# Patient Record
Sex: FEMALE | Race: WHITE | HISPANIC OR LATINO | URBAN - NONMETROPOLITAN AREA
[De-identification: names, ages, dates, MRNs, and addresses within clinical notes are randomized per-mention and may not be internally consistent; named-entity substitution may affect disease eponyms.]

---

## 2024-05-25 ENCOUNTER — HOSPITAL ENCOUNTER (OUTPATIENT)
Dept: LAB | Facility: MEDICAL CENTER | Age: 29
End: 2024-05-25
Attending: FAMILY MEDICINE
Payer: COMMERCIAL

## 2024-05-25 LAB
BASOPHILS # BLD AUTO: 0.4 % (ref 0–1.8)
BASOPHILS # BLD: 0.02 K/UL (ref 0–0.12)
CRP SERPL HS-MCNC: <0.3 MG/DL (ref 0–0.75)
EOSINOPHIL # BLD AUTO: 0.12 K/UL (ref 0–0.51)
EOSINOPHIL NFR BLD: 2.4 % (ref 0–6.9)
ERYTHROCYTE [DISTWIDTH] IN BLOOD BY AUTOMATED COUNT: 41 FL (ref 35.9–50)
ERYTHROCYTE [SEDIMENTATION RATE] IN BLOOD BY WESTERGREN METHOD: 6 MM/HOUR (ref 0–25)
HCT VFR BLD AUTO: 42.6 % (ref 37–47)
HGB BLD-MCNC: 14.4 G/DL (ref 12–16)
IMM GRANULOCYTES # BLD AUTO: 0.01 K/UL (ref 0–0.11)
IMM GRANULOCYTES NFR BLD AUTO: 0.2 % (ref 0–0.9)
LYMPHOCYTES # BLD AUTO: 2.16 K/UL (ref 1–4.8)
LYMPHOCYTES NFR BLD: 42.5 % (ref 22–41)
MCH RBC QN AUTO: 31 PG (ref 27–33)
MCHC RBC AUTO-ENTMCNC: 33.8 G/DL (ref 32.2–35.5)
MCV RBC AUTO: 91.8 FL (ref 81.4–97.8)
MONOCYTES # BLD AUTO: 0.36 K/UL (ref 0–0.85)
MONOCYTES NFR BLD AUTO: 7.1 % (ref 0–13.4)
NEUTROPHILS # BLD AUTO: 2.41 K/UL (ref 1.82–7.42)
NEUTROPHILS NFR BLD: 47.4 % (ref 44–72)
NRBC # BLD AUTO: 0 K/UL
NRBC BLD-RTO: 0 /100 WBC (ref 0–0.2)
PLATELET # BLD AUTO: 307 K/UL (ref 164–446)
PMV BLD AUTO: 10.8 FL (ref 9–12.9)
RBC # BLD AUTO: 4.64 M/UL (ref 4.2–5.4)
RHEUMATOID FACT SER IA-ACNC: 12 IU/ML (ref 0–14)
URATE SERPL-MCNC: 3.1 MG/DL (ref 1.9–8.2)
WBC # BLD AUTO: 5.1 K/UL (ref 4.8–10.8)

## 2024-05-27 LAB
CCP IGA+IGG SERPL IA-ACNC: 2 UNITS (ref 0–19)
NUCLEAR IGG SER QL IA: NORMAL

## 2024-09-25 ENCOUNTER — APPOINTMENT (OUTPATIENT)
Dept: RADIOLOGY | Facility: IMAGING CENTER | Age: 29
End: 2024-09-25
Attending: FAMILY MEDICINE
Payer: COMMERCIAL

## 2024-09-25 ENCOUNTER — OFFICE VISIT (OUTPATIENT)
Dept: URGENT CARE | Facility: PHYSICIAN GROUP | Age: 29
End: 2024-09-25
Payer: COMMERCIAL

## 2024-09-25 VITALS
WEIGHT: 123 LBS | TEMPERATURE: 97.3 F | DIASTOLIC BLOOD PRESSURE: 68 MMHG | HEART RATE: 74 BPM | RESPIRATION RATE: 16 BRPM | HEIGHT: 63 IN | SYSTOLIC BLOOD PRESSURE: 124 MMHG | BODY MASS INDEX: 21.79 KG/M2 | OXYGEN SATURATION: 100 %

## 2024-09-25 DIAGNOSIS — M25.50 POLYARTHRALGIA: ICD-10-CM

## 2024-09-25 PROCEDURE — 99214 OFFICE O/P EST MOD 30 MIN: CPT | Performed by: FAMILY MEDICINE

## 2024-09-25 PROCEDURE — 77077 JOINT SURVEY SINGLE VIEW: CPT | Mod: TC,FY | Performed by: RADIOLOGY

## 2024-09-25 PROCEDURE — 3074F SYST BP LT 130 MM HG: CPT | Performed by: FAMILY MEDICINE

## 2024-09-25 PROCEDURE — 3078F DIAST BP <80 MM HG: CPT | Performed by: FAMILY MEDICINE

## 2024-09-25 RX ORDER — PREDNISONE 20 MG/1
60 TABLET ORAL DAILY
Qty: 15 TABLET | Refills: 0 | Status: SHIPPED | OUTPATIENT
Start: 2024-09-25 | End: 2024-09-30

## 2024-09-25 RX ORDER — NORGESTIMATE AND ETHINYL ESTRADIOL 0.25-0.035
1 KIT ORAL
COMMUNITY
Start: 2024-09-23

## 2024-09-25 NOTE — PROGRESS NOTES
"Cc:   chronic pain in joints        HPI:        C/o pain in both hands , wrists, elbows every day x 3 mths.      She saw PCP in June - she had autoimmune w/u, but does not know results       Pain was completley alleviated with one week of prednisone.           Review of Systems   Constitutional: Negative for fever, chills and malaise/fatigue.   Eyes: Negative for vision changes, d/c.    Respiratory: Negative for cough and sputum production.    Cardiovascular: Negative for chest pain and palpitations.   Gastrointestinal: Negative for nausea, vomiting, abdominal pain, diarrhea and constipation.   Genitourinary: Negative for dysuria, urgency and frequency.   Skin: Negative for rash or  itching.   Neurological: Negative for dizziness and tingling.   Psychiatric/Behavioral: Negative for depression.   Hematologic/lymphatic - denies bruising or excessive bleeding  All other systems reviewed and are negative.      OBJECTIVE  /68   Pulse 74   Temp 36.3 °C (97.3 °F) (Temporal)   Resp 16   Ht 1.6 m (5' 3\")   Wt 55.8 kg (123 lb)   SpO2 100%   HEENT - PERRLA, EOMI  Neuro - alert and oriented x3. CN 2-12 grossly intact.  Lungs - CTA. No wheezes, rhonchi or rales.  Heart - regular rate and rhythm without murmur.  Abdomen - soft and non-tender, bowel sounds active x4.  Musculoskeletal:        Bilat hands/wrists:  unremarkable.    No erythema, skin changes or joint swelling, effusions or deformities.     No visits with results within 4 Month(s) from this visit.   Latest known visit with results is:   Hospital Outpatient Visit on 05/25/2024   Component Date Value Ref Range Status    Antinuclear Antibody 05/25/2024 None Detected  None Detected Final    Comment: If suspicion of connective tissue disease is strong and MELVIN EIA is  negative, consider testing for MELVIN by IFA ([id number]).    No antibodies to Anti-Nuclear Antibodies (MELVIN) detected.  The  Extractable Nuclear Antigen Antibodies (RNP, Arrington, SSA 52, SSA  60, " Scleroderma, Mary-1 and SSB) and Double Stranded DNA (dsDNA)  Antibody, IgG will not be performed.  INTERPRETIVE INFORMATION: Anti-Nuclear Antibodies (MELVIN), IgG by  SINAN  Antinuclear Antibodies (MELVIN), IgG by SINAN: MELVIN specimens are  screened using enzyme-linked immunosorbent assay (SINAN)  methodology. All SINAN results reported as Detected are further  tested by indirect fluorescent assay (IFA) using HEp-2 substrate  with an IgG-specific conjugate. The MELVIN SINAN screen is designed  to detect antibodies against dsDNA, histones, SS-A (Ro), SS-B  (La), Arrington, Arrington/RNP, Scl-70, Mary-1, centromeric proteins, other  antigens extracted from the HEp-2 cell nucleus. MELVIN SINAN assays  have been reported to have lower sensitivities than MELVIN IFA                            for  systemic autoimmune rheumatic diseases (SARD).  Negative results do not necessarily rule out SARD.  Performed By: [facility]  [address] [place] [id number]  : Kvng Chen MD, PhD  CLIA Number: [id number]      WBC 05/25/2024 5.1  4.8 - 10.8 K/uL Final    RBC 05/25/2024 4.64  4.20 - 5.40 M/uL Final    Hemoglobin 05/25/2024 14.4  12.0 - 16.0 g/dL Final    Hematocrit 05/25/2024 42.6  37.0 - 47.0 % Final    MCV 05/25/2024 91.8  81.4 - 97.8 fL Final    MCH 05/25/2024 31.0  27.0 - 33.0 pg Final    MCHC 05/25/2024 33.8  32.2 - 35.5 g/dL Final    RDW 05/25/2024 41.0  35.9 - 50.0 fL Final    Platelet Count 05/25/2024 307  164 - 446 K/uL Final    MPV 05/25/2024 10.8  9.0 - 12.9 fL Final    Neutrophils-Polys 05/25/2024 47.40  44.00 - 72.00 % Final    Lymphocytes 05/25/2024 42.50 (H)  22.00 - 41.00 % Final    Monocytes 05/25/2024 7.10  0.00 - 13.40 % Final    Eosinophils 05/25/2024 2.40  0.00 - 6.90 % Final    Basophils 05/25/2024 0.40  0.00 - 1.80 % Final    Immature Granulocytes 05/25/2024 0.20  0.00 - 0.90 % Final    Nucleated RBC 05/25/2024 0.00  0.00 - 0.20 /100 WBC Final    Neutrophils (Absolute) 05/25/2024 2.41   1.82 - 7.42 K/uL Final    Includes immature neutrophils, if present.    Lymphs (Absolute) 05/25/2024 2.16  1.00 - 4.80 K/uL Final    Monos (Absolute) 05/25/2024 0.36  0.00 - 0.85 K/uL Final    Eos (Absolute) 05/25/2024 0.12  0.00 - 0.51 K/uL Final    Baso (Absolute) 05/25/2024 0.02  0.00 - 0.12 K/uL Final    Immature Granulocytes (abs) 05/25/2024 0.01  0.00 - 0.11 K/uL Final    NRBC (Absolute) 05/25/2024 0.00  K/uL Final    Rheumatoid Factor -Neph- 05/25/2024 12  0 - 14 IU/mL Final    Cyclic Citrullinated Peptide Ab, I* 05/25/2024 2  0 - 19 Units Final    Comment: INTERPRETIVE INFORMATION: Cyclic Citrullinated Peptide Ab, IgG/A  19 Units or less ................... Negative  20-39 Units ........................ Weak Positive  40-59 Units ........................ Moderate Positive  60 Units or greater ................ Strong Positive  A positive result for cyclic citrullinated peptide (CCP)  antibodies in conjunction with consistent clinical features may be  suggestive of rheumatoid arthritis (RA). Anti-CCP, IgG/IgA  antibodies are present in about 66-74 percent of RA patients and  have specificities of 96-99 percent. Detection of IgA antibodies  in addition to the usual IgG antibodies enhances the sensitivity  due to some RA patients having IgA antibodies to CCP in the  absence of IgG. These autoantibodies may be present in the  preclinical phase of disease, are associated with future RA  development, and may predict radiographic joint destruction.  Patients with weak positive results should be monitored and  testing repeated.  Performed By: [facility]                           [facility]  [address] [place] [id number]  : Kvng Chen MD, PhD  CLIA Number: [id number]      Stat C-Reactive Protein 05/25/2024 <0.30  0.00 - 0.75 mg/dL Final    Sed Rate Westergren 05/25/2024 6  0 - 25 mm/hour Final    Uric Acid 05/25/2024 3.1  1.9 - 8.2 mg/dL Final      Result Notes  Details    Reading  Physician Reading Date Result Priority   Bay Camilo M.D.  [phone] 9/25/2024      Narrative & Impression     9/25/2024 1:27 PM     HISTORY/REASON FOR EXAM:  Pain in the hands.        TECHNIQUE/EXAM DESCRIPTION AND NUMBER OF VIEWS:  Joint survey, single view of the hands.     COMPARISON: None     FINDINGS:  There is no evidence of acute fracture. There is no evidence of dislocation.  No focal bone erosions are appreciated around the joint spaces.  No joint surface irregularity is identified.     No calcified or ossified soft tissue mass is identified.     IMPRESSION:     1.  No acute fracture is identified. No bone erosions are identified.              Exam Ended: 09/25/24  1:38 PM Last Resulted: 09/25/24  1:40 PM        A/P:      1. Polyarthralgia  Autoimmune labs reviewed:   Sed rate, RF, MELVIN all negative.     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       Will trial 60mg prednisone x 5 d      Advised f/u PCP        - predniSONE (DELTASONE) 20 MG Tab; Take 3 Tablets by mouth every day for 5 days.  Dispense: 15 Tablet; Refill: 0  - DX-JOINT SURVEY-HANDS SINGLE VIEW; Future

## 2025-03-29 ENCOUNTER — OFFICE VISIT (OUTPATIENT)
Dept: URGENT CARE | Facility: PHYSICIAN GROUP | Age: 30
End: 2025-03-29
Payer: COMMERCIAL

## 2025-03-29 VITALS
SYSTOLIC BLOOD PRESSURE: 122 MMHG | HEIGHT: 63 IN | TEMPERATURE: 96.3 F | DIASTOLIC BLOOD PRESSURE: 66 MMHG | HEART RATE: 72 BPM | WEIGHT: 127 LBS | RESPIRATION RATE: 16 BRPM | OXYGEN SATURATION: 98 % | BODY MASS INDEX: 22.5 KG/M2

## 2025-03-29 DIAGNOSIS — K02.9 PAIN DUE TO DENTAL CARIES: ICD-10-CM

## 2025-03-29 RX ORDER — LIDOCAINE HYDROCHLORIDE 20 MG/ML
15 SOLUTION OROPHARYNGEAL PRN
Qty: 1200 ML | Refills: 0 | Status: SHIPPED | OUTPATIENT
Start: 2025-03-29

## 2025-03-29 RX ORDER — AMOXICILLIN 500 MG/1
500 CAPSULE ORAL 2 TIMES DAILY
Qty: 14 CAPSULE | Refills: 0 | Status: SHIPPED | OUTPATIENT
Start: 2025-03-29 | End: 2025-04-05

## 2025-03-29 ASSESSMENT — ENCOUNTER SYMPTOMS: FEVER: 0

## 2025-03-29 NOTE — PROGRESS NOTES
"Subjective:   Lily BLISS is a 29 y.o. female who presents for Oral Swelling (Dentist appointment on Tuesday, constant pain/throbbing x3days)      Patient with existing cavity that needs a filling however over the course of a week she started noting some discomfort. Over the last 3 days has had worsening pain     Review of Systems   Constitutional:  Negative for fever.       Medications, Allergies, and current problem list reviewed today in Epic.     Objective:     /66 (BP Location: Right arm, Patient Position: Sitting, BP Cuff Size: Adult)   Pulse 72   Temp (!) 35.7 °C (96.3 °F) (Temporal)   Resp 16   Ht 1.6 m (5' 3\")   Wt 57.6 kg (127 lb)   SpO2 98%     Physical Exam  Vitals reviewed.   Constitutional:       Appearance: Normal appearance.   HENT:      Head: Normocephalic and atraumatic.      Right Ear: External ear normal.      Left Ear: External ear normal.      Nose: Nose normal.      Mouth/Throat:      Mouth: Mucous membranes are moist.        Comments: Area of tooth emergence with redness of gingiva. No pointing or abscess  Eyes:      Extraocular Movements: Extraocular movements intact.      Conjunctiva/sclera: Conjunctivae normal.      Pupils: Pupils are equal, round, and reactive to light.   Cardiovascular:      Rate and Rhythm: Normal rate.   Pulmonary:      Effort: Pulmonary effort is normal.   Skin:     General: Skin is warm and dry.      Capillary Refill: Capillary refill takes less than 2 seconds.   Neurological:      Mental Status: She is alert and oriented to person, place, and time.         Assessment/Plan:     Diagnosis and associated orders:     1. Pain due to dental caries  amoxicillin (AMOXIL) 500 MG Cap    lidocaine (XYLOCAINE) 2 % Solution         Comments/MDM:     Follow up with dentist         Differential diagnosis, natural history, supportive care, and indications for immediate follow-up discussed.    Advised the patient to follow-up with the primary care physician for " recheck, reevaluation, and consideration of further management.    Please note that this dictation was created using voice recognition software. I have made a reasonable attempt to correct obvious errors, but I expect that there are errors of grammar and possibly content that I did not discover before finalizing the note.    This note was electronically signed by Alcon Roberto PA-C

## 2025-08-27 ENCOUNTER — OFFICE VISIT (OUTPATIENT)
Dept: URGENT CARE | Facility: PHYSICIAN GROUP | Age: 30
End: 2025-08-27
Payer: COMMERCIAL

## 2025-08-27 VITALS
HEART RATE: 96 BPM | OXYGEN SATURATION: 98 % | TEMPERATURE: 98.3 F | DIASTOLIC BLOOD PRESSURE: 70 MMHG | WEIGHT: 124 LBS | HEIGHT: 63 IN | BODY MASS INDEX: 21.97 KG/M2 | SYSTOLIC BLOOD PRESSURE: 122 MMHG | RESPIRATION RATE: 18 BRPM

## 2025-08-27 DIAGNOSIS — U07.1 COVID-19: Primary | ICD-10-CM

## 2025-08-27 DIAGNOSIS — J02.9 PHARYNGITIS, UNSPECIFIED ETIOLOGY: ICD-10-CM

## 2025-08-27 LAB
FLUAV RNA SPEC QL NAA+PROBE: NEGATIVE
FLUBV RNA SPEC QL NAA+PROBE: NEGATIVE
RSV RNA SPEC QL NAA+PROBE: NEGATIVE
S PYO DNA SPEC NAA+PROBE: NOT DETECTED
SARS-COV-2 RNA RESP QL NAA+PROBE: POSITIVE

## 2025-08-27 PROCEDURE — 99213 OFFICE O/P EST LOW 20 MIN: CPT | Performed by: NURSE PRACTITIONER

## 2025-08-27 PROCEDURE — 3074F SYST BP LT 130 MM HG: CPT | Performed by: NURSE PRACTITIONER

## 2025-08-27 PROCEDURE — 3078F DIAST BP <80 MM HG: CPT | Performed by: NURSE PRACTITIONER

## 2025-08-27 PROCEDURE — 87651 STREP A DNA AMP PROBE: CPT | Performed by: NURSE PRACTITIONER

## 2025-08-27 PROCEDURE — 87637 SARSCOV2&INF A&B&RSV AMP PRB: CPT | Performed by: NURSE PRACTITIONER

## 2025-08-27 RX ORDER — AMOXICILLIN 500 MG/1
TABLET, FILM COATED ORAL
COMMUNITY
Start: 2025-07-31

## 2025-08-27 RX ORDER — HYDROCODONE BITARTRATE AND ACETAMINOPHEN 5; 325 MG/1; MG/1
1 TABLET ORAL EVERY 6 HOURS PRN
COMMUNITY
Start: 2025-07-31

## 2025-08-27 RX ORDER — ONDANSETRON 4 MG/1
TABLET, ORALLY DISINTEGRATING ORAL
COMMUNITY
Start: 2025-07-31

## 2025-08-27 RX ORDER — IBUPROFEN 800 MG/1
800 TABLET, FILM COATED ORAL EVERY 6 HOURS PRN
COMMUNITY
Start: 2025-07-31

## 2025-08-27 ASSESSMENT — ENCOUNTER SYMPTOMS
TROUBLE SWALLOWING: 0
SORE THROAT: 1
SINUS PAIN: 1
HEADACHES: 0
DIARRHEA: 0